# Patient Record
Sex: MALE | Race: BLACK OR AFRICAN AMERICAN | URBAN - METROPOLITAN AREA
[De-identification: names, ages, dates, MRNs, and addresses within clinical notes are randomized per-mention and may not be internally consistent; named-entity substitution may affect disease eponyms.]

---

## 2017-05-12 ENCOUNTER — OFFICE VISIT (OUTPATIENT)
Dept: FAMILY MEDICINE | Facility: CLINIC | Age: 42
End: 2017-05-12

## 2017-05-12 VITALS
HEART RATE: 72 BPM | DIASTOLIC BLOOD PRESSURE: 76 MMHG | SYSTOLIC BLOOD PRESSURE: 120 MMHG | BODY MASS INDEX: 21.53 KG/M2 | WEIGHT: 145.4 LBS | TEMPERATURE: 98.3 F | HEIGHT: 69 IN

## 2017-05-12 DIAGNOSIS — J02.9 VIRAL PHARYNGITIS: ICD-10-CM

## 2017-05-12 DIAGNOSIS — G56.22 ULNAR NEUROPATHY OF LEFT UPPER EXTREMITY: ICD-10-CM

## 2017-05-12 DIAGNOSIS — K12.0 APHTHOUS ULCER: ICD-10-CM

## 2017-05-12 DIAGNOSIS — Z00.00 ROUTINE GENERAL MEDICAL EXAMINATION AT A HEALTH CARE FACILITY: Primary | ICD-10-CM

## 2017-05-12 LAB
CHOLEST SERPL-MCNC: 153.5 MG/DL (ref 0–200)
CHOLEST/HDLC SERPL: 3.7 {RATIO} (ref 0–5)
HBA1C MFR BLD: 5 % (ref 4.1–5.7)
HDLC SERPL-MCNC: 41.8 MG/DL
LDLC SERPL CALC-MCNC: 55 MG/DL (ref 0–129)
TRIGL SERPL-MCNC: 285.8 MG/DL (ref 0–150)
VLDL CHOLESTEROL: 57.2 MG/DL (ref 7–32)

## 2017-05-12 RX ORDER — TRIAMCINOLONE ACETONIDE 0.1 %
PASTE (GRAM) DENTAL 2 TIMES DAILY
Qty: 5 G | Refills: 1 | Status: SHIPPED | OUTPATIENT
Start: 2017-05-12 | End: 2018-02-20

## 2017-05-12 RX ORDER — IBUPROFEN 800 MG/1
800 TABLET, FILM COATED ORAL EVERY 8 HOURS PRN
Qty: 30 TABLET | Refills: 1 | Status: SHIPPED | OUTPATIENT
Start: 2017-05-12

## 2017-05-12 NOTE — LETTER
June 6, 2017      Mikey Oseguera  [address] [place] [address]        Dear Mikey,    Please see below for your test results.      Dear Mr. Oseguera,    Attached are your lab results. Your lipid panel shows that your cholesterol is normal, however, your triglycerides are still high. This can be partially related to your genes but your diet has large impact on your triglycerides. Please continue to eat a healthy high fiber diet with lots of fruits and vegetables and work on decreasing the amount of fatty foods and red meats. Exercising 5 times a week for 30 minutes will also be helpful. We will plan to recheck your lipid panel in one year. Your hemoglobin A1C was normal. This is a blood test to screen for diabetes.    Please call with any questions.  Resulted Orders   Lipid Panel ([facility])   Result Value Ref Range    Cholesterol 153.5 0.0 - 200.0 mg/dL    Cholesterol/HDL Ratio 3.7 0.0 - 5.0    HDL Cholesterol 41.8 >40.0 mg/dL    LDL Cholesterol Calculated 55 0 - 129 mg/dL    Triglycerides 285.8 (H) 0.0 - 150.0 mg/dL    VLDL Cholesterol 57.2 (H) 7.0 - 32.0 mg/dL   Hemoglobin A1c ([facility])   Result Value Ref Range    Hemoglobin A1C 5.0 4.1 - 5.7 %       If you have any questions, please call the clinic to make an appointment.    Sincerely,    Homa Walter MD

## 2017-05-12 NOTE — PROGRESS NOTES
Male Physical Note      Concerns today:  Sore throat of 3 days duration. Apthous ulcers over past week. Pain with swallowing. Denies nasal congestion, rhinitis, cough. Associated red, watery eyes.     ROS:                      CONSTITUTIONAL: no fatigue, no unexpected change in weight  SKIN: no worrisome rashes, no worrisome moles, no worrisome lesions  EYES: no acute vision problems or changes  ENT: no ear problems, no mouth problems, no throat problems  RESP: no significant cough, no shortness of breath  CV: no chest pain, no palpitations, no new or worsening peripheral edema  GI: no nausea, no vomiting, no constipation, no diarrhea  : no frequency, no dysuria, no hematuria  NEURO: no weakness, no dizziness, no syncope, no headaches  ENDOCRINE: no temperature intolerance, no skin/hair changes    History reviewed. No pertinent past medical history.     Family History   Problem Relation Age of Onset     DIABETES No family hx of      Coronary Artery Disease No family hx of      Hypertension No family hx of      Cancer - colorectal No family hx of      Reviewed no other significant FH           Family History and past Medical History reviewed and unchanged/updated.    Social History   Substance Use Topics     Smoking status: Never Smoker     Smokeless tobacco: Not on file     Alcohol use No       Children ? Yes; 3 boys, 4 girls.    Has anyone hurt you physically, for example by pushing, hitting, slapping or kicking you or forcing you to have sex? Denies  Do you feel threatened or controlled by a partner, ex-partner or anyone in your life? Denies    RISK BEHAVIORS AND HEALTHY HABITS:  Tobacco Use/Smoking: None  Illicit Drug Use: None  ETOH: None  Do you use alcohol? No  Sexually Active: Yes  Diet (5-7 servings of fruits/veg daily): Most days   Exercise (30 min accumulated most days):Yes  Dental Care: Yes   Calcium 1500 mg/d:  Yes  Seat Belt Use: Yes     CV Risk based on Pooled Cohort Risk:3 %  "      Immunization History   Administered Date(s) Administered     Influenza Vaccine, 3 YRS +, IM (QUADRIVALENT W/PRESERVATIVES) 11/03/2014, 12/03/2015     TDAP Vaccine (Boostrix) 08/20/2015     Reviewed Immunization Record Today  /76  Pulse 72  Temp 98.3  F (36.8  C) (Oral)  Ht 5' 8.5\" (174 cm)  Wt 145 lb 6.4 oz (66 kg)  BMI 21.79 kg/m2  GENERAL: healthy, alert and no distress  EYES: bilateral conjunctival injection, no scleral icterus, EOMI, PERRLA, fundi benign  HENT: ear canals and TM's normal. Oropharynx mildly erythematous, no exudates. Aphthous ulcer located on lower lip inner mucosa.   NECK: mild bilateral anterior cervical LAD  RESP: lungs clear to auscultation - no rales, no rhonchi, no wheezes  CV: regular rates and rhythm, normal S1 S2, no S3 or S4 and no murmur, no click or rub -  ABDOMEN: soft, no tenderness, no  hepatosplenomegaly, no masses, normal bowel sounds  MS: extremities- no gross deformities noted, no edema  SKIN: no suspicious lesions, no rashes  NEURO: strength and tone- normal, sensory exam- grossly normal, mentation- intact, speech- normal, reflexes- symmetric  BACK: no CVA tenderness, no paralumbar tenderness  PSYCH: Alert and oriented times 3; speech- coherent , normal rate and volume; able to articulate logical thoughts, able to abstract reason, no tangential thoughts, no hallucinations or delusions, affect- normal    ASSESSMENT:  1. Health Care Maintenance: Normal Physical Exam  2. Aphthous ulcer  3. Viral pharyngitis    PLAN:  1. Lipid panel, A1C ordered.   2. Kenalog orabase for apthous ulcer.  3. Recommended salt water gargles for viral pharyngitis.   4. Ibuprofen prn for throat pain, mild msk pain.  5. Vit D prescribed for hx of Vit D deficiency.     Patient precepted with Dr. Frank.    Homa Walter, DO   PGY-1 [facility]  Pager: [phone]    "

## 2017-05-12 NOTE — MR AVS SNAPSHOT
After Visit Summary   5/12/2017    Mikey Oseguera    MRN: [id number]           Patient Information     Date Of Birth          1975        Visit Information        Provider Department      5/12/2017 2:30 PM Homa Walter MD [facility]        Today's Diagnoses     Routine general medical examination at a health care facility    -  1    Aphthous ulcer        Viral pharyngitis        Ulnar neuropathy of left upper extremity          Care Instructions      Preventive Health Recommendations  Male Ages 40 to 49    Yearly exam:             See your health care provider every year in order to  o   Review health changes.   o   Discuss preventive care.    o   Review your medicines if your doctor has prescribed any.    You should be tested each year for STDs (sexually transmitted diseases) if you re at risk.     Have a cholesterol test every 5 years.     Have a colonoscopy (test for colon cancer) if someone in your family has had colon cancer or polyps before age 50.     After age 45, have a diabetes test (fasting glucose). If you are at risk for diabetes, you should have this test every 3 years.      Talk with your health care provider about whether or not a prostate cancer screening test (PSA) is right for you.    Shots: Get a flu shot each year. Get a tetanus shot every 10 years.     Nutrition:    Eat at least 5 servings of fruits and vegetables daily.     Eat whole-grain bread, whole-wheat pasta and brown rice instead of white grains and rice.     Talk to your provider about Calcium and Vitamin D.     Lifestyle    Exercise for at least 150 minutes a week (30 minutes a day, 5 days a week). This will help you control your weight and prevent disease.     Limit alcohol to one drink per day.     No smoking.     Wear sunscreen to prevent skin cancer.     See your dentist every six months for an exam and cleaning.            Follow-ups after your visit        Follow-up notes from your care team      "Return in about 1 year (around 2018).      Who to contact     Please call your clinic at [phone] to:    Ask questions about your health    Make or cancel appointments    Discuss your medicines    Learn about your test results    Speak to your doctor   If you have compliments or concerns about an experience at your clinic, or if you wish to file a complaint, please contact [facility] Patient Relations at [phone] or email us at [email or web address]         Additional Information About Your Visit        [employer] Information     [employer] is an electronic gateway that provides easy, online access to your medical records. With [employer], you can request a clinic appointment, read your test results, renew a prescription or communicate with your care team.     To sign up for [employer] visit the website at [email or web address]   You will be asked to enter the access code listed below, as well as some personal information. Please follow the directions to create your username and password.     Your access code is: [email or web address]  Expires: 8/10/2017  3:17 PM     Your access code will  in 90 days. If you need help or a new code, please contact your [facility] or call [phone] for assistance.        Care EveryWhere ID     This is your Care EveryWhere ID. This could be used by other organizations to access your [place] medical records  [id number]        Your Vitals Were     Pulse Temperature Height BMI (Body Mass Index)          72 98.3  F (36.8  C) (Oral) 5' 8.5\" (174 cm) 21.79 kg/m2         Blood Pressure from Last 3 Encounters:   17 120/76   10/03/16 120/74   12/03/15 125/77    Weight from Last 3 Encounters:   17 145 lb 6.4 oz (66 kg)   10/03/16 153 lb 12.8 oz (69.8 kg)   12/03/15 145 lb (65.8 kg)              We Performed the Following     Hemoglobin A1c ([facility])     Lipid Panel ([place])          Today's Medication " Changes          These changes are accurate as of: 5/12/17  3:18 PM.  If you have any questions, ask your nurse or doctor.               Start taking these medicines.        Dose/Directions    triamcinolone 0.1 % paste   Commonly known as:  KENALOG   Used for:  Aphthous ulcer   Started by:  Homa Walter MD        Take by mouth 2 times daily   Quantity:  5 g   Refills:  1            Where to get your medicines      These medications were sent to [facility]/pharmacy #[id number] - [place], [place] - [address] [facility]  [address], [place] [address]    Hours:  24-hours Phone:  [phone]     cholecalciferol 1000 UNIT tablet    ibuprofen 800 MG tablet    triamcinolone 0.1 % paste                Primary Care Provider Office Phone # Fax #    Obdulio Mckinney [phone]       [facility] [address] [place] [address]        Thank you!     Thank you for choosing [facility]  for your care. Our goal is always to provide you with excellent care. Hearing back from our patients is one way we can continue to improve our services. Please take a few minutes to complete the written survey that you may receive in the mail after your visit with us. Thank you!             Your Updated Medication List - Protect others around you: Learn how to safely use, store and throw away your medicines at www.disposemymeds.org.          This list is accurate as of: 5/12/17  3:18 PM.  Always use your most recent med list.                   Brand Name Dispense Instructions for use    cholecalciferol 1000 UNIT tablet    vitamin D    100 tablet    Take 1 tablet (1,000 Units) by mouth daily       clotrimazole 1 % cream    LOTRIMIN    15 g    Apply topically 2 times daily       doxepin 10 MG capsule    SINEquan    30 capsule    Take 1 capsule (10 mg) by mouth At Bedtime       ibuprofen 800 MG tablet    ADVIL/MOTRIN    30 tablet    Take 1 tablet (800 mg) by mouth every 8 hours as needed for moderate pain        polyethylene glycol 0.4%- propylene glycol 0.3% 0.4-0.3 % Soln ophthalmic solution    SYSTANE    1 Bottle    Place 1 drop into both eyes every hour as needed for dry eyes       ranitidine 150 MG tablet    ZANTAC    60 tablet    Take 1 tablet (150 mg) by mouth 2 times daily       triamcinolone 0.1 % paste    KENALOG    5 g    Take by mouth 2 times daily

## 2017-05-12 NOTE — PROGRESS NOTES
Preceptor attestation:  Patient seen and discussed with the resident. Assessment and plan reviewed with resident and agreed upon.  Supervising physician: Young Frank  [facility]

## 2017-06-06 PROBLEM — E78.1 HYPERTRIGLYCERIDEMIA: Status: ACTIVE | Noted: 2017-06-06

## 2018-01-03 ENCOUNTER — OFFICE VISIT (OUTPATIENT)
Dept: FAMILY MEDICINE | Facility: CLINIC | Age: 43
End: 2018-01-03
Payer: COMMERCIAL

## 2018-01-03 VITALS
SYSTOLIC BLOOD PRESSURE: 131 MMHG | WEIGHT: 148 LBS | HEIGHT: 69 IN | BODY MASS INDEX: 21.92 KG/M2 | TEMPERATURE: 97.7 F | DIASTOLIC BLOOD PRESSURE: 84 MMHG | OXYGEN SATURATION: 100 % | HEART RATE: 59 BPM

## 2018-01-03 DIAGNOSIS — K12.0 RECURRENT APHTHOUS ULCER: ICD-10-CM

## 2018-01-03 DIAGNOSIS — J02.9 VIRAL PHARYNGITIS: Primary | ICD-10-CM

## 2018-01-03 RX ORDER — DEXAMETHASONE 0.5 MG/5ML
5 ELIXIR ORAL 3 TIMES DAILY PRN
Qty: 300 ML | Refills: 1 | Status: SHIPPED | OUTPATIENT
Start: 2018-01-03 | End: 2018-02-20

## 2018-01-03 NOTE — MR AVS SNAPSHOT
After Visit Summary   1/3/2018    Ulises Valdez    MRN: [id number]           Patient Information     Date Of Birth          1975        Visit Information        Provider Department      1/3/2018 9:20 AM Lee Azul DO [facility]        Today's Diagnoses     Viral pharyngitis    -  1    Recurrent aphthous ulcer          Care Instructions    - The sore throat should go away in about 1 week  - drink plenty of fluids  - Try honey, cough drops, and cepacol spray  - For the aphthous ulcers, try the dexamethasone elexir   - Call us or go to ED if trouble breathings, swallowing, pain, or feel unwell          Follow-ups after your visit        Who to contact     Please call your clinic at [phone] to:    Ask questions about your health    Make or cancel appointments    Discuss your medicines    Learn about your test results    Speak to your doctor   If you have compliments or concerns about an experience at your clinic, or if you wish to file a complaint, please contact [facility] Patient Relations at [phone] or email us at [email or web address]         Additional Information About Your Visit        MyChart Information     [employer] is an electronic gateway that provides easy, online access to your medical records. With [employer], you can request a clinic appointment, read your test results, renew a prescription or communicate with your care team.     To sign up for [employer] visit the website at [email or web address]   You will be asked to enter the access code listed below, as well as some personal information. Please follow the directions to create your username and password.     Your access code is: [email or web address]  Expires: 4/3/2018 10:01 AM     Your access code will  in 90 days. If you need help or a new code, please contact your [facility] or call [phone] for assistance.        Care EveryWhere ID      "This is your Care EveryWhere ID. This could be used by other organizations to access your [place] medical records  [id number]        Your Vitals Were     Pulse Temperature Height Pulse Oximetry BMI (Body Mass Index)       59 97.7  F (36.5  C) (Oral) 5' 9\" (175.3 cm) 100% 21.86 kg/m2        Blood Pressure from Last 3 Encounters:   01/03/18 131/84   05/12/17 120/76   10/03/16 120/74    Weight from Last 3 Encounters:   01/03/18 148 lb (67.1 kg)   05/12/17 145 lb 6.4 oz (66 kg)   10/03/16 153 lb 12.8 oz (69.8 kg)              Today, you had the following     No orders found for display         Today's Medication Changes          These changes are accurate as of: 1/3/18 10:01 AM.  If you have any questions, ask your nurse or doctor.               Start taking these medicines.        Dose/Directions    dexamethasone 0.5 MG/5ML Elix   Used for:  Recurrent aphthous ulcer   Started by:  Lee Azul,         Dose:  5 mL   Swish and spit 5 mLs (0.5 mg) in mouth 3 times daily as needed Hold in mouth for 5 min before spitting   Quantity:  300 mL   Refills:  1            Where to get your medicines      These [id number] - [place], [place] - [address] [place] [address] AT [facility] [address]  [address], [place] [address]     Phone:  [phone]     dexamethasone 0.5 MG/5ML Elix                Primary Care Provider Office Phone # Fax #    Obdulio Mckinney [phone]       [facility] [address] [place] [address]        Equal Access to Services     DIONNE MURRAY AH: Hadii ha Sarmiento, wajulianada luqadaha, qaybta kaalmakassie brizuela, francisca luciano. So [facility] [phone].    ATENCIÓN: Si habla español, tiene a gagnon disposición servicios gratuitos de asistencia lingüística. Llame al [phone].    We comply with applicable federal civil rights laws and [place] laws. We do not discriminate on the basis of race, color, national origin, " age, disability, sex, sexual orientation, or gender identity.            Thank you!     Thank you for choosing [facility]  for your care. Our goal is always to provide you with excellent care. Hearing back from our patients is one way we can continue to improve our services. Please take a few minutes to complete the written survey that you may receive in the mail after your visit with us. Thank you!             Your Updated Medication List - Protect others around you: Learn how to safely use, store and throw away your medicines at www.disposemymeds.org.          This list is accurate as of: 1/3/18 10:01 AM.  Always use your most recent med list.                   Brand Name Dispense Instructions for use Diagnosis    cholecalciferol 1000 UNIT tablet    vitamin D3    100 tablet    Take 1 tablet (1,000 Units) by mouth daily    Routine general medical examination at a health care facility       clotrimazole 1 % cream    LOTRIMIN    15 g    Apply topically 2 times daily    Tinea corporis       dexamethasone 0.5 MG/5ML Elix     300 mL    Swish and spit 5 mLs (0.5 mg) in mouth 3 times daily as needed Hold in mouth for 5 min before spitting    Recurrent aphthous ulcer       doxepin 10 MG capsule    SINEquan    30 capsule    Take 1 capsule (10 mg) by mouth At Bedtime    Insomnia, unspecified type       ibuprofen 800 MG tablet    ADVIL/MOTRIN    30 tablet    Take 1 tablet (800 mg) by mouth every 8 hours as needed for moderate pain    Ulnar neuropathy of left upper extremity       polyethylene glycol 0.4%- propylene glycol 0.3% 0.4-0.3 % Soln ophthalmic solution    SYSTANE    1 Bottle    Place 1 drop into both eyes every hour as needed for dry eyes    Dry eyes, bilateral       ranitidine 150 MG tablet    ZANTAC    60 tablet    Take 1 tablet (150 mg) by mouth 2 times daily    Heart burn       triamcinolone 0.1 % paste    KENALOG    5 g    Take by mouth 2 times daily    Aphthous ulcer

## 2018-01-03 NOTE — PROGRESS NOTES
Preceptor attestation:  Patient seen and discussed with the resident. Assessment and plan reviewed with resident and agreed upon.  Supervising physician: Rafa Agrawal  [facility]

## 2018-01-03 NOTE — PROGRESS NOTES
"  Family History   Problem Relation Age of Onset     DIABETES No family hx of      Coronary Artery Disease No family hx of      Hypertension No family hx of      Cancer - colorectal No family hx of      Social History     Social History     Marital status:      Spouse name: N/A     Number of children: N/A     Years of education: N/A     Social History Main Topics     Smoking status: Never Smoker     Smokeless tobacco: Never Used     Alcohol use No     Drug use: No     Sexual activity: Not Asked     Other Topics Concern     None     Social History Narrative       There are no exam notes on file for this visit.  Chief Complaint   Patient presents with     Pharyngitis     Sore throat causing voice to change.  Feels like something is blocking it per patient.       Sores in mouth     Sores in mouth for a week.       Blood pressure 131/84, pulse 59, temperature 97.7  F (36.5  C), temperature source Oral, height 5' 9\" (175.3 cm), weight 148 lb (67.1 kg), SpO2 100 %.      S:  CC: Sore throat    Sore throat started 1 week ago and has been increasing in intensity since then. Has occurred in previous years every winter and relieved with tea and OTC meds. Patient has occasional cough about 2-3 x a day. No fevers, chills, runny nose, no new achys in arms or legs. No vomiting diarrhea    Regarding the sores in the mouth, started in 2014, has been having ulcers in mouth the come and go. Lasts about 1 week. No known triggers or foods that exacerbates. Spontaneously remits. Has tried changing tooth brushes but not relieved.  Patient denies any rashes on any part of his body including his genitals.  Patient also denies any bruising.  Patient denies any sick contacts with people that have similar sores.      O:  /84  Pulse 59  Temp 97.7  F (36.5  C) (Oral)  Ht 5' 9\" (175.3 cm)  Wt 148 lb (67.1 kg)  SpO2 100%  BMI 21.86 kg/m2  General: On Chair, occasional cough, NAD,   HEENT: No conjunctivitis, no scleral " injections, EOM intact.  TM is non-bulging, no erythema, no fluid behind ear.   Patient has noneerythematous nasal tubunates, has clear rhinorhea. patent nares  Throat is non erythmatous with no postnasal drip noted with non swollen tonsils  Patient has a shallow ulcer on R lower lip about 2 mm wide  Neck has anterior adenopathy  Heart: RRR, no murmurs, rubs clicks  Respiratory: No respiratory distress, no accessory muscle use, no cough. clear  breath sounds throughout  Skin: No lesions noted    A/P:  1. Viral pharyngitis  Patient has sore throat recurrent yearly.  Likely viral pharyngitis at this time.  Differential includes strep pharyngitis however, centor criteria is 1 for anterior adenopathy.  The differential also includes pneumonia, mono, postnasal drip, GERD however all unlikely at this time.  No imaging necessary.  Discussed with patient use symptomatic care including drinking plenty of fluids, tea, also recommended using Cepacol spray for symptomatic relief of the sore throat.  Patient follow-up if worsening symptoms, fevers, chills, or feels unwell.  -Symptomatic care    2. Recurrent aphthous ulcer  Patient has had these recurrent aphthous ulcers in mouth.  Patient has not tried any over-the-counter medication.  He has tried changing toothbrush without any relief.  Patient reports that he has no other rashes on his body or other symptoms to suggest systemic illness issues like Crohns or infections.  He denies any sick contacts with similar issues.  We discussed starting him on dexamethasone 5 mL elixir 3 times a day.  Discussed side effects including possible candidiasis with him.  Patient understands that he needs to rinse his mouth with water after holding the elixir there for 5 minutes.  He is also aware to return to clinic if he has any other rashes body, or the rash fails to improve.  - dexamethasone 0.5 MG/5ML ELIX; Swish and spit 5 mLs (0.5 mg) in mouth 3 times daily as needed Hold in mouth for 5  min before spitting  Dispense: 300 mL; Refill: 1    Lee Azul  Family Medicine Resident PGY3

## 2018-01-03 NOTE — PATIENT INSTRUCTIONS
- The sore throat should go away in about 1 week  - drink plenty of fluids  - Try honey, cough drops, and cepacol spray  - For the aphthous ulcers, try the dexamethasone elexir   - Call us or go to ED if trouble breathings, swallowing, pain, or feel unwell

## 2018-02-20 ENCOUNTER — OFFICE VISIT (OUTPATIENT)
Dept: FAMILY MEDICINE | Facility: CLINIC | Age: 43
End: 2018-02-20
Payer: COMMERCIAL

## 2018-02-20 VITALS
HEART RATE: 72 BPM | OXYGEN SATURATION: 97 % | HEIGHT: 69 IN | WEIGHT: 155.8 LBS | DIASTOLIC BLOOD PRESSURE: 73 MMHG | BODY MASS INDEX: 23.08 KG/M2 | TEMPERATURE: 99.4 F | RESPIRATION RATE: 16 BRPM | SYSTOLIC BLOOD PRESSURE: 123 MMHG

## 2018-02-20 DIAGNOSIS — R07.0 THROAT PAIN: Primary | ICD-10-CM

## 2018-02-20 DIAGNOSIS — J02.0 STREP THROAT: ICD-10-CM

## 2018-02-20 LAB — S PYO AG THROAT QL IA.RAPID: POSITIVE

## 2018-02-20 RX ORDER — PENICILLIN V POTASSIUM 500 MG/1
500 TABLET, FILM COATED ORAL 2 TIMES DAILY
Qty: 20 TABLET | Refills: 0 | Status: SHIPPED | OUTPATIENT
Start: 2018-02-20 | End: 2018-03-12

## 2018-02-20 NOTE — PROGRESS NOTES
Preceptor attestation:  Patient seen and discussed with the resident. Assessment and plan reviewed with resident and agreed upon.  Supervising physician: Mark Cain MD  [facility]

## 2018-02-20 NOTE — PATIENT INSTRUCTIONS
Thank you for coming to [facility] for your care.     Please be sure to :-  1. Take your antibiotic as discussed. 500 mg (1 tablet) twice a day for the next 10 days.   2. Return to clinic in 2 weeks if symptoms don't improve.   3. Call the Clinic or go to the ED if having any worsening and/or concerning symptoms.     Makeda Perry MD        Pharyngitis: Strep (Confirmed)    You have had a positive test for strep throat. Strep throat is a contagious illness. It is spread by coughing, kissing or by touching others after touching your mouth or nose. Symptoms include throat pain that is worse with swallowing, aching all over, headache, and fever. It is treated with antibiotic medicine. This should help you start to feel better in 1 to 2 days.  Home care    Rest at home. Drink plenty of fluids to you won't get dehydrated.    No work or school for the first 2 days of taking the antibiotics. After this time, you will not be contagious. You can then return to school or work if you are feeling better.     Take antibiotic medicine for the full 10 days, even if you feel better. This is very important to ensure the infection is treated. It is also important to prevent medicine-resistant germs from developing. If you were given an antibiotic shot, you don't need any more antibiotics.    You may use acetaminophen or ibuprofen to control pain or fever, unless another medicine was prescribed for this. Talk with your doctor before taking these medicines if you have chronic liver or kidney disease. Also talk with your doctor if you have had a stomach ulcer or GI bleeding.    Throat lozenges or sprays help reduce pain. Gargling with warm saltwater will also reduce throat pain. Dissolve 1/2 teaspoon of salt in 1 glass of warm water. This may be useful just before meals.     Soft foods are OK. Avoid salty or spicy foods.  Follow-up care  Follow up with your healthcare provider or our staff if you don't get  better over the next week.  When to seek medical advice  Call your healthcare provider right away if any of these occur:    Fever of 100.4 F (38 C) or higher, or as directed by your healthcare provider    New or worsening ear pain, sinus pain, or headache    Painful lumps in the back of neck    Stiff neck    Lymph nodes getting larger or becoming soft in the middle    You can't swallow liquids or you can't open your mouth wide because of throat pain    Signs of dehydration. These include very dark urine or no urine, sunken eyes, and dizziness.    Trouble breathing or noisy breathing    Muffled voice    Rash  Date Last Reviewed: 4/13/2015 2000-2017 [employer] [address] [place] [address], [place] [address]. All rights reserved. This information is not intended as a substitute for professional medical care. Always follow your healthcare professional's instructions.

## 2018-02-20 NOTE — PROGRESS NOTES
SUBJECTIVE       Ulises Valdez is a 42 year old male with a PMH significant for   Patient Active Problem List   Diagnosis     Health care maintenance     Vitamin D deficiency     Hyperglycemia     Hypertriglyceridemia    who presents for evaluation of cough and sore throat.    Leonelanet presents with a 3 day hx of symptoms including cough and sore throat. He states that cough is somewhat productive but especially in the morning. He endorses yellowish and blood tinged sputum but denies any bright red blood with his cough. He denies any chest pain or shortness of breath. He has no night time sweating or recent weight loss. He denies any fevers or chills although his temp is 99.4 today. 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 No hx of TB exposure and he is a non smoker.     Patient endorses difficulty swallowing and some itching sensation behind his ears. He has had some headache for which Ibuprofen has been helpful. He also tried Nyquill this morning for his symptoms. Patient denies any nausea, vomiting, diarrhea or constipation. He has not had his flu shot thi year and is not interested in one today.     Patient speaks English, so an  was not used.    Medications and problem list have been reviewed and updated.         REVIEW OF SYSTEMS     General: No fevers, chills  HEENT: Positive for headache, no dizziness. Positive for conjunctival injection.   Neck: Pain with swallowing but no difficulty swallowing.    Resp: Positive for cough. Denies congestion, coryza  GI: No constipation, diarrhea, no nausea or vomiting  Skin: No rash        OBJECTIVE     Vitals:    02/20/18 0832   BP: 123/73   BP Location: Left arm   Patient Position: Sitting   Cuff Size: Adult Regular   Pulse: 72   Resp: 16   Temp: 99.4  F (37.4  C)   TempSrc: Oral   SpO2: 97%   Weight: 155 lb 12.8 oz (70.7  "kg)   Height: 5' 9\" (175.3 cm)     Body mass index is 23.01 kg/(m^2).     Gen:  In NAD, good color, appears well hydrated  HEENT: PERRLA; eyes notable for conjunctival injection. TMs normal color and landmarks; nasopharynx pink and moist; oropharynx slightly erythematous. No tonsillar swelling or exudates noted.   Neck: Supple without lymphadenopathy, non tender to palpation.   CV:  RRR  - no murmurs, age appropriate rate  Pulm:  CTAB, no wheezes/rales/rhonchi, good air entry   Abdomen: Soft, nontender, no masses, no rebound, BS intact throughout  Skin: No rashes or lesions noted.    ASSESSMENT AND PLAN     Ali was seen today for pharyngitis, cough, ear problem, fever and headache.    Diagnoses and all orders for this visit:    Throat pain  3 day hx of sore throat, pain with swallowing, ear pain. Rapid strep today was positive. Will plan to treat for this.   - Rapid Strep Screen (Group) ([facility])  - Penicillin V potassium 500 mg BID for 10 days.   - Keep well hydrated  - Tylenol as needed for pain    Options for treatment and/or follow-up care were reviewed with the patient who was engaged and actively involved in the decision making process and verbalized understanding of the options discussed and was satisfied with the final plan.    I discussed the patient's findings, assessment and plan with attending physician Dr. Mark Dowell who was agreeable with plan.    Makeda Perry, PGY1            "

## 2018-02-20 NOTE — MR AVS SNAPSHOT
After Visit Summary   2/20/2018    Ulises Valdez    MRN: [id number]           Patient Information     Date Of Birth          1975        Visit Information        Provider Department      2/20/2018 8:40 AM Makeda Perry MD [facility]        Today's Diagnoses     Throat pain    -  1    Strep throat          Care Instructions    Thank you for coming to [facility] for your care.     Please be sure to :-  1. Take your antibiotic as discussed. 500 mg (1 tablet) twice a day for the next 10 days.   2. Return to clinic in 2 weeks if symptoms don't improve.   3. Call the Clinic or go to the ED if having any worsening and/or concerning symptoms.     Makeda Perry MD        Pharyngitis: Strep (Confirmed)    You have had a positive test for strep throat. Strep throat is a contagious illness. It is spread by coughing, kissing or by touching others after touching your mouth or nose. Symptoms include throat pain that is worse with swallowing, aching all over, headache, and fever. It is treated with antibiotic medicine. This should help you start to feel better in 1 to 2 days.  Home care    Rest at home. Drink plenty of fluids to you won't get dehydrated.    No work or school for the first 2 days of taking the antibiotics. After this time, you will not be contagious. You can then return to school or work if you are feeling better.     Take antibiotic medicine for the full 10 days, even if you feel better. This is very important to ensure the infection is treated. It is also important to prevent medicine-resistant germs from developing. If you were given an antibiotic shot, you don't need any more antibiotics.    You may use acetaminophen or ibuprofen to control pain or fever, unless another medicine was prescribed for this. Talk with your doctor before taking these medicines if you have chronic liver or kidney disease. Also talk with your doctor if you have had a stomach ulcer  or GI bleeding.    Throat lozenges or sprays help reduce pain. Gargling with warm saltwater will also reduce throat pain. Dissolve 1/2 teaspoon of salt in 1 glass of warm water. This may be useful just before meals.     Soft foods are OK. Avoid salty or spicy foods.  Follow-up care  Follow up with your healthcare provider or our staff if you don't get better over the next week.  When to seek medical advice  Call your healthcare provider right away if any of these occur:    Fever of 100.4 F (38 C) or higher, or as directed by your healthcare provider    New or worsening ear pain, sinus pain, or headache    Painful lumps in the back of neck    Stiff neck    Lymph nodes getting larger or becoming soft in the middle    You can't swallow liquids or you can't open your mouth wide because of throat pain    Signs of dehydration. These include very dark urine or no urine, sunken eyes, and dizziness.    Trouble breathing or noisy breathing    Muffled voice    Rash  Date Last Reviewed: 4/13/2015 2000-2017 [employer]. [address] [place] [address]. All rights reserved. This information is not intended as a substitute for professional medical care. Always follow your healthcare professional's instructions.                Follow-ups after your visit        Follow-up notes from your care team     Return in about 2 weeks (around 3/6/2018), or if symptoms worsen or fail to improve.      Who to contact     Please call your clinic at [phone] to:    Ask questions about your health    Make or cancel appointments    Discuss your medicines    Learn about your test results    Speak to your doctor            Additional Information About Your Visit        [employer] Information     [email or web address] is an electronic gateway that provides easy, online access to your medical records. With [email or web address], you can request a clinic appointment, read your test results, renew a prescription or communicate with your care team.     To  "sign up for MyChart visit the website at [email or web address]   You will be asked to enter the access code listed below, as well as some personal information. Please follow the directions to create your username and password.     Your access code is: [email or web address]  Expires: 4/3/2018 10:01 AM     Your access code will  in 90 days. If you need help or a new code, please contact your [facility] or call [phone] for assistance.        Care EveryWhere ID     This is your Care EveryWhere ID. This could be used by other organizations to access your [place] medical records  [id number]        Your Vitals Were     Pulse Temperature Respirations Height Pulse Oximetry BMI (Body Mass Index)    72 99.4  F (37.4  C) (Oral) 16 5' 9\" (175.3 cm) 97% 23.01 kg/m2       Blood Pressure from Last 3 Encounters:   18 123/73   18 131/84   17 120/76    Weight from Last 3 Encounters:   18 155 lb 12.8 oz (70.7 kg)   18 148 lb (67.1 kg)   17 145 lb 6.4 oz (66 kg)              We Performed the Following     Rapid Strep Screen (Group) ([facility])          Today's Medication Changes          These changes are accurate as of 18  9:31 AM.  If you have any questions, ask your nurse or doctor.               Start taking these medicines.        Dose/Directions    penicillin V potassium 500 MG tablet   Commonly known as:  VEETID   Used for:  Strep throat   Started by:  Makeda Perry MD        Dose:  500 mg   Take 1 tablet (500 mg) by mouth 2 times daily   Quantity:  20 tablet   Refills:  0            Where to get your medicines      [facility] - [place], [place] - [address] AT [facility] [address]  [address], [place] [address]     Phone:  [phone]     penicillin V potassium 500 MG tablet                Primary Care Provider Office Phone # Fax #    Obdulio Mckinney [phone]       " [facility] [address] [place] [address]        Equal Access to Services     RODRIGUEZ MURRAY : Jovita Sarmiento, wawillard schmidt, qacassie spiveymakassie brizuela, francisca luciano. So [facility] [phone].    ATENCIÓN: Si habla español, tiene a gagnon disposición servicios gratuitos de asistencia lingüística. Llame al [phone].    We comply with applicable federal civil rights laws and [place] laws. We do not discriminate on the basis of race, color, national origin, age, disability, sex, sexual orientation, or gender identity.            Thank you!     Thank you for choosing [facility]  for your care. Our goal is always to provide you with excellent care. Hearing back from our patients is one way we can continue to improve our services. Please take a few minutes to complete the written survey that you may receive in the mail after your visit with us. Thank you!             Your Updated Medication List - Protect others around you: Learn how to safely use, store and throw away your medicines at www.disposemymeds.org.          This list is accurate as of 2/20/18  9:31 AM.  Always use your most recent med list.                   Brand Name Dispense Instructions for use Diagnosis    ibuprofen 800 MG tablet    ADVIL/MOTRIN    30 tablet    Take 1 tablet (800 mg) by mouth every 8 hours as needed for moderate pain    Ulnar neuropathy of left upper extremity       penicillin V potassium 500 MG tablet    VEETID    20 tablet    Take 1 tablet (500 mg) by mouth 2 times daily    Strep throat       ranitidine 150 MG tablet    ZANTAC    60 tablet    Take 1 tablet (150 mg) by mouth 2 times daily    Heart burn

## 2018-03-12 ENCOUNTER — OFFICE VISIT (OUTPATIENT)
Dept: FAMILY MEDICINE | Facility: CLINIC | Age: 43
End: 2018-03-12
Payer: COMMERCIAL

## 2018-03-12 VITALS
WEIGHT: 157 LBS | BODY MASS INDEX: 23.18 KG/M2 | TEMPERATURE: 98 F | OXYGEN SATURATION: 97 % | HEART RATE: 63 BPM | DIASTOLIC BLOOD PRESSURE: 86 MMHG | SYSTOLIC BLOOD PRESSURE: 133 MMHG

## 2018-03-12 DIAGNOSIS — R07.0 THROAT PAIN: Primary | ICD-10-CM

## 2018-03-12 LAB — S PYO AG THROAT QL IA.RAPID: NEGATIVE

## 2018-03-12 NOTE — MR AVS SNAPSHOT
After Visit Summary   3/12/2018    Ulises Valdez    MRN: [id number]           Patient Information     Date Of Birth          1975        Visit Information        Provider Department      3/12/2018 3:30 PM Andres De La Rosa MD [facility]        Today's Diagnoses     Throat pain    -  1      Care Instructions    -Please follow-up in 1 month.     -We will call you to schedule the Ear, Nose, Throat referral.           Follow-ups after your visit        Additional Services     OTOLARYNGOLOGY REFERRAL       Patient prefers to be called    Reason for Referral: Recurrent neck pain since .     needed: No  Language: English    May leave message on voicemail: Yes                  Future tests that were ordered for you today     Open Future Orders        Priority Expected Expires Ordered    OTOLARYNGOLOGY REFERRAL Routine  3/12/2019 3/12/2018            Who to contact     Please call your clinic at [phone] to:    Ask questions about your health    Make or cancel appointments    Discuss your medicines    Learn about your test results    Speak to your doctor            Additional Information About Your Visit        MyChart Information     [employer] is an electronic gateway that provides easy, online access to your medical records. With Stoke, you can request a clinic appointment, read your test results, renew a prescription or communicate with your care team.     To sign up for Stoke visit the website at [email or web address]   You will be asked to enter the access code listed below, as well as some personal information. Please follow the directions to create your username and password.     Your access code is: [email or web address]  Expires: 4/3/2018 11:01 AM     Your access code will  in 90 days. If you need help or a new code, please contact your [facility] or call [phone] for assistance.        Care EveryWhere ID     This is your Care  EveryWhere ID. This could be used by other organizations to access your [place] medical records  [id number]        Your Vitals Were     Pulse Temperature Pulse Oximetry BMI (Body Mass Index)          63 98  F (36.7  C) 97% 23.18 kg/m2         Blood Pressure from Last 3 Encounters:   03/12/18 133/86   02/20/18 123/73   01/03/18 131/84    Weight from Last 3 Encounters:   03/12/18 157 lb (71.2 kg)   02/20/18 155 lb 12.8 oz (70.7 kg)   01/03/18 148 lb (67.1 kg)              We Performed the Following     Rapid Strep Screen (Group) ([facility])          Today's Medication Changes          These changes are accurate as of 3/12/18  4:34 PM.  If you have any questions, ask your nurse or doctor.               Stop taking these medicines if you haven't already. Please contact your care team if you have questions.     penicillin V potassium 500 MG tablet   Commonly known as:  VEETID   Stopped by:  Andres De La Rosa MD                    Primary Care Provider Office Phone # Fax #    Obdulio Mckinney [phone]       [facility] [address] [place] [address]        Equal Access to Services     RODRIGUEZ MURRAY AH: Hadii ha galarzao Soolga, waaxda luqadaha, qaybta kaalmada adechayoyada, francisca luciano. So [facility] [phone].    ATENCIÓN: Si habla español, tiene a gagnon disposición servicios gratuitos de asistencia lingüística. Wiliam al [phone].    We comply with applicable federal civil rights laws and [place] laws. We do not discriminate on the basis of race, color, national origin, age, disability, sex, sexual orientation, or gender identity.            Thank you!     Thank you for choosing [facility]  for your care. Our goal is always to provide you with excellent care. Hearing back from our patients is one way we can continue to improve our services. Please take a few minutes to complete the written survey that you may receive in the mail after your visit with us. Thank  you!             Your Updated Medication List - Protect others around you: Learn how to safely use, store and throw away your medicines at www.disposemymeds.org.          This list is accurate as of 3/12/18  4:34 PM.  Always use your most recent med list.                   Brand Name Dispense Instructions for use Diagnosis    ibuprofen 800 MG tablet    ADVIL/MOTRIN    30 tablet    Take 1 tablet (800 mg) by mouth every 8 hours as needed for moderate pain    Ulnar neuropathy of left upper extremity       ranitidine 150 MG tablet    ZANTAC    60 tablet    Take 1 tablet (150 mg) by mouth 2 times daily    Heart burn

## 2018-03-12 NOTE — PATIENT INSTRUCTIONS
-Please follow-up in 1 month.     -We will call you to schedule the Ear, Nose, Throat referral.     [facility]  [address]  [place] [address]  ph: [phone]  fax: [phone]    Appointment  Date:3/28/18  Time:1:10pm arrival     Please contact the above clinic if you need to cancel or reschedule. Feel free to contact me with any questions. Thanks!    Esther  Care Coordinator  [phone]

## 2018-03-12 NOTE — PROGRESS NOTES
ASSESSMENT AND PLAN      Ulises was seen today for recheck.    Diagnoses and all orders for this visit:    Throat pain: Patient with negative strep. patient with recurring throat pain over the last 4 years, no obvious reason for this pain that I could note.  Refer to ENT for further evaluation at this time.  -     OTOLARYNGOLOGY REFERRAL; Future  -     Rapid Strep Screen (Group) ([facility])  -     Group A Strep Throat ([facility])  - Follow-up in 1 month      Andres De La Rosa MD PGY2  [facility]                SUBJECTIVE       Ali Sven Valdez is a 42 year old  male with a PMH significant for:     Patient Active Problem List   Diagnosis     Health care maintenance     Vitamin D deficiency     Hyperglycemia     Hypertriglyceridemia     He presents with sore throat on the right.  Patient had similar pain in February,  he was diagnosed with acute streptococcal pharyngitis and treated with penicillin V. Notes that the pain improved with treatment. Pain returned 2-3 days ago, notes pain with swallowing. Pain worse when palpating right side of neck.  Patient also has intermittent cough.  He notes that he has been having intermittent throat pain since 2014.  He notes that the pain gets worse for a few days at times, and resolves for the next 1-2 weeks. He wishes to have a specialist see him regarding his throat pain. Patient repeatedly asking for refills of penicillin V.    Patient denies any fever, rhinorrhea, chest pain, shortness of breath    No history of smoking, alcohol use, or illicit drug use.    PMH, Medications and Allergies were reviewed and updated as needed.            OBJECTIVE     Vitals:    03/12/18 1555   BP: 133/86   Pulse: 63   Temp: 98  F (36.7  C)   SpO2: 97%   Weight: 157 lb (71.2 kg)     Body mass index is 23.18 kg/(m^2).    General: Well-appearing, no acute distress, answering questions appropriately.  HEENT: Atraumatic, normocephalic.  No conjunctival injection, no scleral icterus.  Pharynx  clear, no tonsillar enlargement, no tonsillar exudates.  TMs clear bilaterally.  Neck: No thyromegaly, mild right anterior cervical chain lymphadenopathy.  CV: S1, S2, regular rate and rhythm.  No murmurs noted.  Respiratory: Clear to auscultation bilaterally.  No wheezes or crackles noted.    Results for orders placed or performed in visit on 03/12/18 (from the past 24 hour(s))   Rapid Strep Screen (Group) ([facility])   Result Value Ref Range    Rapid Strep A Screen NEGATIVE Negative

## 2018-03-13 LAB — GROUP A STREP,THROAT: NORMAL

## 2018-03-17 NOTE — PROGRESS NOTES
Preceptor attestation:  Patient seen and discussed with the resident. Assessment and plan reviewed with resident and agreed upon.  Supervising physician: Ender Vasques  [facility]